# Patient Record
Sex: MALE | Race: BLACK OR AFRICAN AMERICAN
[De-identification: names, ages, dates, MRNs, and addresses within clinical notes are randomized per-mention and may not be internally consistent; named-entity substitution may affect disease eponyms.]

---

## 2018-08-02 ENCOUNTER — HOSPITAL ENCOUNTER (EMERGENCY)
Dept: HOSPITAL 97 - ER | Age: 28
Discharge: HOME | End: 2018-08-02
Payer: SELF-PAY

## 2018-08-02 DIAGNOSIS — J03.00: Primary | ICD-10-CM

## 2018-08-02 DIAGNOSIS — R11.2: ICD-10-CM

## 2018-08-02 PROCEDURE — 87081 CULTURE SCREEN ONLY: CPT

## 2018-08-02 PROCEDURE — 99284 EMERGENCY DEPT VISIT MOD MDM: CPT

## 2018-08-02 NOTE — EDPHYS
Physician Documentation                                                                           

 [facility]                                                                

Name: Christian Smith                                                                             

Age: 28 yrs                                                                                       

Sex: Male                                                                                         

: 1990                                                                                   

MRN: [id number]                                                                                   

Arrival Date: 2018                                                                          

Time: 08:19                                                                                       

Account#: [id number]                                                                            

Bed 15                                                                                            

Private MD: None, None                                                                            

ED Physician Surendra Hilton                                                                       

HPI:                                                                                              

                                                                                             

08:34 This 28 yrs old Black Male presents to ER via Ambulatory with complaints of Abdominal   cp  

      Pain, Nausea/Vomiting, Fever.                                                               

08:34 The patient presents with sore throat.                                                  cp  

08:34 The patient describes throat pain as constant.                                          cp  

08:34 Onset: The symptoms/episode began/occurred 2 day(s) ago.                                cp  

08:34 Severity of symptoms: in the emergency department the symptoms are unchanged, despite   cp  

      home interventions. Associated signs and symptoms: Pertinent positives: fever, nausea,      

      Sore throat vomiting, abdominal pain.                                                       

                                                                                                  

Historical:                                                                                       

- Allergies:                                                                                      

08:25 No Known Allergies;                                                                     ss  

- Home Meds:                                                                                      

08:25 None [Active];                                                                          ss  

- PMHx:                                                                                           

08:25 None;                                                                                   ss  

- PSHx:                                                                                           

08:25 None;                                                                                   ss  

                                                                                                  

- Immunization history:: Adult Immunizations up to date.                                          

- Social history:: Smoking status: Patient/guardian denies using tobacco.                         

- Ebola Screening: : Patient denies exposure to infectious person Patient denies travel           

  to an Ebola-affected area in the 21 days before illness onset.                                  

                                                                                                  

                                                                                                  

ROS:                                                                                              

08:37 Constitutional: Negative for body aches, chills, fever, poor PO intake.                 cp  

08:37 Eyes: Negative for injury, pain, redness, and discharge.                                cp  

08:37 ENT: Positive for sore throat, Negative for drainage from ear(s), ear pain, rhinorrhea,     

      difficulty swallowing, difficulty handling secretions.                                      

08:37 Neck: Negative for pain with movement, pain at rest, stiffness.                             

08:37 Cardiovascular: Negative for chest pain.                                                    

08:37 Respiratory: Negative for cough, wheezing.                                                  

08:37 Abdomen/GI: Positive for abdominal pain, nausea and vomiting, Negative for diarrhea,        

      constipation, black/tarry stool, rectal bleeding.                                           

08:37 Back: Negative for pain at rest, pain with movement, radiated pain.                         

08:37 : Negative for urinary symptoms.                                                          

08:37 Skin: Negative for cellulitis, rash.                                                        

08:37 Neuro: Negative for altered mental status, weakness.                                        

08:37 All other systems are negative.                                                             

                                                                                                  

Exam:                                                                                             

08:42 Constitutional: The patient appears in no acute distress, alert, awake, non-toxic, well cp  

      developed, well nourished.                                                                  

08:42 Head/Face:  Normocephalic, atraumatic.                                                  cp  

08:42 Eyes: Periorbital structures: appear normal, Pupils: equal, round, and reactive to          

      light and accomodation, Extraocular movements: intact throughout, Conjunctiva: normal,      

      no exudate, no injection, Sclera: no appreciated abnormality, Lids and lashes: appear       

      normal, bilaterally.                                                                        

08:42 ENT: External ear(s): are unremarkable, Ear canal(s): are normal, clear, TM's: bulging,     

      is not appreciated, bilaterally, dullness, bilaterally, erythema, is not appreciated,       

      bilaterally, Nose: is normal, Mouth: Lips: moist, Oral mucosa: moist, Posterior             

      pharynx: Airway: no evidence of obstruction, patent, Tonsils: bilaterally enlarged,         

      with erythema, with exudate, Uvula: midline, swelling, is not appreciated, erythema,        

      that is mild.                                                                               

08:42 Neck: ROM/movement: is normal, is supple, without pain, no range of motions                 

      limitations, no meningismus, no nuchal rigidity, Lymph nodes: lymphadenopathy is            

      appreciated, anterior cervical nodes.                                                       

08:42 Chest/axilla: Inspection: normal, Palpation: is normal, no crepitus, no tenderness.         

08:42 Cardiovascular: Rate: normal, Rhythm: regular.                                              

08:42 Respiratory: the patient does not display signs of respiratory distress,  Respirations:     

      normal, no use of accessory muscles, no retractions, no splinting, no tachypnea,            

      labored breathing, is not present, Breath sounds: are clear throughout, no decreased        

      breath sounds, no stridor, no wheezing.                                                     

08:42 Abdomen/GI: Inspection: abdomen appears normal, Bowel sounds: active, all quadrants,        

      Palpation: soft, in all quadrants, mild abdominal tenderness, in the epigastric area,       

      rebound tenderness, is not appreciated, voluntary guarding, is not appreciated,             

      involuntary guarding, is not appreciated.                                                   

08:42 Back: pain, is absent, ROM is normal.                                                       

08:42 Skin: cellulitis, is not appreciated, no rash present.                                      

08:42 Neuro: Orientation: to person, place \T\ time. Mentation: lucid, able to follow commands.   

                                                                                                  

Vital Signs:                                                                                      

08:25  / 72; Pulse 99; Resp 15; Temp 99.2(O); Pulse Ox 97% on R/A; Weight 72.57 kg;     ss  

      Height 5 ft. 5 in. (165.10 cm); Pain 5/10;                                                  

09:48 BP 96 / 77; Pulse 97; Resp 17; Pulse Ox 97% on R/A;                                     mh5 

09:55  / 65; Pulse 89; Resp 17; Pulse Ox 96% on R/A; Pain 2/10;                         rb1 

08:25 Body Mass Index 26.63 (72.57 kg, 165.10 cm)                                             ss  

                                                                                                  

MDM:                                                                                              

08:21 Patient medically screened.                                                             cp  

09:47 Data reviewed: vital signs, nurses notes, lab test result(s), and as a result, I will   cp  

      discharge patient.                                                                          

09:47 Differential diagnosis: apthous ulcer, group A strep tonsillitis, laryngitis, ekta's  cp  

      angina, peritonsillar abscess pharyngitis, retropharyngeal abcess. Counseling: I had a      

      detailed discussion with the patient and/or guardian regarding: the historical points,      

      exam findings, and any diagnostic results supporting the discharge/admit diagnosis, to      

      return to the emergency department if symptoms worsen or persist or if there are any        

      questions or concerns that arise at home. Response to treatment: the patient's symptoms     

      have markedly improved after treatment, VSS. Nausea and pain improved. No vomiting          

      observed in ED, and as a result, I will discharge patient.                                  

                                                                                                  

                                                                                             

08:34 Order name: Strep; Complete Time: 09:53                                                 cp  

                                                                                             

09:53 Interpretation: Reviewed.                                                               cp  

                                                                                                  

Administered Medications:                                                                         

08:42 Drug: Zofran 4 mg Route: PO;                                                            rb1 

09:10 Follow up: Response: No adverse reaction; Nausea is decreased                           rb1 

08:42 Drug: Ibuprofen 800 mg Route: PO;                                                       rb1 

09:10 Follow up: Response: No adverse reaction; Pain is decreased                             rb1 

09:49 Drug: Augmentin 875 mg Route: PO;                                                       rb1 

09:55 Follow up: Response: No adverse reaction; Medication administered at discharge.         rb1 

                                                                                                  

                                                                                                  

Disposition:                                                                                      

11:23 Co-signature as Attending Physician, Surendra Hilton MD I agree with the assessment and   kdr 

      plan of care.                                                                               

                                                                                                  

Disposition:                                                                                      

18 09:48 Discharged to Home. Impression: Streptococcal tonsillitis, Nausea and vomiting.    

- Condition is Stable.                                                                            

- Discharge Instructions: Nausea and Vomiting, Adult, Tonsillitis.                                

- Prescriptions for Augmentin 875- 125 mg Oral Tablet - take 1 tablet by ORAL route               

  every 12 hours for 10 days; 20 tablet. Zofran 4 mg Oral Tablet - take 1 tablet by               

  ORAL route every 12 hours As needed; 20 tablet. Ibuprofen 800 mg Oral Tablet - take 1           

  tablet by ORAL route every 8 hours As needed take with food; 30 tablet.                         

- Medication Reconciliation Form, Thank You Letter, Antibiotic Education, Prescription            

  Opioid Use form.                                                                                

- Follow up: Private Physician; When: 48 Hours; Reason: Recheck today's complaints.               

- Problem is new.                                                                                 

- Symptoms have improved.                                                                         

                                                                                                  

                                                                                                  

                                                                                                  

Signatures:                                                                                       

Dispatcher [employer]                           EDMS                                                 

uSrendra Hilton MD MD   kdr                                                  

Yue Wood RN                      RN   ss                                                   

J Carlos Dietz PA                         PA   Amira Collazo RN                     RN   rb1                                                  

                                                                                                  

Corrections: (The following items were deleted from the chart)                                    

09:56 09:48 2018 09:48 Discharged to Home. Impression: Streptococcal tonsillitis;       rb1 

      Nausea and vomiting. Condition is Stable. Forms are Medication Reconciliation Form,         

      Thank You Letter, Antibiotic Education, Prescription Opioid Use. Follow up: Private         

      Physician; When: 48 Hours; Reason: Recheck today's complaints. Problem is new. Symptoms     

      have improved. cp                                                                           

23: 08:40 Constitutional: The patient appears in no acute distress, alert, awake,     cp  

      non-toxic, well developed, well nourished, cp                                               

                                                                                             

23: 08:40 Head/Face: Normocephalic, atraumatic. cp                                    cp  

                                                                                             

23: 08:40 Eyes: Periorbital structures: appear normal, Conjunctiva: normal, no        cp  

      exudate, no injection, Sclera: no appreciated abnormality, Lids and lashes: appear          

      normal, bilaterally, cp                                                                     

/                                                                                             

23:26  08:40 ENT: External ear(s): are unremarkable, Ear canal(s): are normal, clear,    cp  

      TM's: bulging, is not appreciated, bilaterally, dullness, bilaterally, erythema, is not     

      appreciated, bilaterally, Nose: is normal, Mouth: Lips: moist, Oral mucosa: moist,          

      Posterior pharynx: Airway: no evidence of obstruction, patent, Tonsils: bilaterally         

      enlarged, with erythema, with exudate, Uvula: midline, swelling, is not appreciated,        

      erythema, that is mild, Voice: is normal, cp                                                

23: 08:40 Neck: ROM/movement: is normal, is supple, without pain, no range of motions cp  

      limitations, no meningismus, no nuchal rigidity, Lymph nodes: lymphadenopathy is            

      appreciated, anterior cervical nodes, cp                                                    

23: 08:40 Chest/axilla: Inspection: normal, Palpation: is normal, no crepitus, no     cp  

      tenderness, cp                                                                              

23: 08:40 Cardiovascular: Rate: normal, Rhythm: regular, cp                           cp  

23: 08:40 Respiratory: the patient does not display signs of respiratory distress,    cp  

      Respirations: normal, no use of accessory muscles, no retractions, no splinting, no         

      tachypnea, labored breathing, is not present, Breath sounds: are clear throughout, no       

      decreased breath sounds, no stridor, no wheezing, cp                                        

23: 08:40 Abdomen/GI: Inspection: abdomen appears normal, Bowel sounds: active, all   cp  

      quadrants, Palpation: soft, in all quadrants, mild abdominal tenderness, in the             

      epigastric area, rebound tenderness, is not appreciated, voluntary guarding, is not         

      appreciated, involuntary guarding, is not appreciated, cp                                   

23: 08:40 Back: pain, is absent, ROM is normal, cp                                    cp  

23: 08:40 Skin: cellulitis, is not appreciated, no rash present. cp                   cp  

23: 08:40 Neuro: Orientation: to person, place \T\ time. Mentation: is normal, cp       cp

                                                                                                  

**************************************************************************************************

## 2018-08-02 NOTE — ER
Nurse's Notes                                                                                     

 [facility]                                                                

Name: Christian Smith                                                                             

Age: 28 yrs                                                                                       

Sex: Male                                                                                         

: 1990                                                                                   

MRN: [id number]                                                                                   

Arrival Date: 2018                                                                          

Time: 08:19                                                                                       

Account#: [id number]                                                                            

Bed 15                                                                                            

Private MD: None, None                                                                            

Diagnosis: Streptococcal tonsillitis;Nausea and vomiting                                          

                                                                                                  

Presentation:                                                                                     

                                                                                             

08:24 Presenting complaint: Patient states: sore throat, N/V and fever x 2 days. Transition   ss  

      of care: patient was not received from another setting of care. Onset of symptoms was       

      2018. Risk Assessment: Do you want to hurt yourself or someone else? Patient       

      reports no desire to harm self or others. Initial Sepsis Screen: Does the patient meet      

      any 2 criteria? No. Patient's initial sepsis screen is negative. Does the patient have      

      a suspected source of infection? No. Patient's initial sepsis screen is negative. Care      

      prior to arrival: None.                                                                     

08:24 Method Of Arrival: Ambulatory                                                           ss  

08:24 Acuity: FLORENTINO 4                                                                           ss  

                                                                                                  

Historical:                                                                                       

- Allergies:                                                                                      

08:25 No Known Allergies;                                                                     ss  

- Home Meds:                                                                                      

08:25 None [Active];                                                                          ss  

- PMHx:                                                                                           

08:25 None;                                                                                   ss  

- PSHx:                                                                                           

08:25 None;                                                                                   ss  

                                                                                                  

- Immunization history:: Adult Immunizations up to date.                                          

- Social history:: Smoking status: Patient/guardian denies using tobacco.                         

- Ebola Screening: : Patient denies exposure to infectious person Patient denies travel           

  to an Ebola-affected area in the 21 days before illness onset.                                  

                                                                                                  

                                                                                                  

Screenin:27 Abuse screen: Denies threats or abuse. Nutritional screening: No deficits noted.        rb1 

      Tuberculosis screening: No symptoms or risk factors identified. Fall Risk None              

      identified.                                                                                 

                                                                                                  

Assessment:                                                                                       

08:27 General: Appears in no apparent distress. comfortable, Behavior is calm, cooperative,   rb1 

      Reports fever for 1-2 days. Pain: Complains of pain in abdomen Pain currently is 4 out      

      of 10 on a pain scale. Quality of pain is described as pain started after the pt.           

      vomited. Neuro: Level of Consciousness is awake, alert, obeys commands, Oriented to         

      person, place, time, situation. Cardiovascular: Capillary refill < 3 seconds is brisk       

      in bilateral fingers. Respiratory: Airway is patent Respiratory effort is even,             

      unlabored, Respiratory pattern is regular, symmetrical. GI: Bowel sounds present X 4        

      quads. Abd is soft X 4 quads Reports diarrhea, nausea, vomiting, since x 2 days. : No     

      signs and/or symptoms were reported regarding the genitourinary system. Derm: Skin is       

      dry, Skin is normal, Skin temperature is warm.                                              

09:25 Reassessment: Patient appears in no apparent distress at this time. No changes from     rb1 

      previously documented assessment.                                                           

                                                                                                  

Vital Signs:                                                                                      

08:25  / 72; Pulse 99; Resp 15; Temp 99.2(O); Pulse Ox 97% on R/A; Weight 72.57 kg;     ss  

      Height 5 ft. 5 in. (165.10 cm); Pain 5/10;                                                  

09:48 BP 96 / 77; Pulse 97; Resp 17; Pulse Ox 97% on R/A;                                     mh5 

09:55  / 65; Pulse 89; Resp 17; Pulse Ox 96% on R/A; Pain 2/10;                         rb1 

08:25 Body Mass Index 26.63 (72.57 kg, 165.10 cm)                                               

                                                                                                  

ED Course:                                                                                        

08:19 Patient arrived in ED.                                                                  sb2 

08:20 None, None is Private Physician.                                                        sb2 

08:21 J Carlos Dietz PA is PHCP.                                                                cp  

08:21 Surendra Hilton MD is Attending Physician.                                              cp  

08:25 Triage completed.                                                                       ss  

08:25 Arm band placed on right wrist.                                                         ss  

08:27 Amira Quiñonez, RN is Primary Nurse.                                                   rb1 

08:27 Patient has correct armband on for positive identification. Bed in low position. Call   rb1 

      light in reach. Side rails up X 1. Pulse ox on. NIBP on.                                    

08:42 Strep Sent.                                                                             rb1 

09:55 No provider procedures requiring assistance completed. Patient did not have IV access   rb1 

      during this emergency room visit.                                                           

                                                                                                  

Administered Medications:                                                                         

08:42 Drug: Zofran 4 mg Route: PO;                                                            rb1 

09:10 Follow up: Response: No adverse reaction; Nausea is decreased                           rb1 

08:42 Drug: Ibuprofen 800 mg Route: PO;                                                       rb1 

09:10 Follow up: Response: No adverse reaction; Pain is decreased                             rb1 

09:49 Drug: Augmentin 875 mg Route: PO;                                                       rb1 

09:55 Follow up: Response: No adverse reaction; Medication administered at discharge.         rb1 

                                                                                                  

                                                                                                  

Outcome:                                                                                          

09:48 Discharge ordered by MD.                                                                cp  

09:55 Discharged to home ambulatory, with family.                                             rb1 

09:55 Condition: stable                                                                           

09:55 Discharge instructions given to patient, Instructed on discharge instructions, follow       

      up and referral plans. medication usage, Demonstrated understanding of instructions,        

      follow-up care, medications, Prescriptions given X 3.                                       

09:56 Patient left the ED.                                                                    rb1 

                                                                                                  

Signatures:                                                                                       

Yue Wood, RN                      RN   ss                                                   

J Carlos Dietz PA PA cp Barber, Rebecca, RN                     RN   rb1                                                  

Henny Cantor                              [facility] [place]                                                  

Julia Britton                               [facility]                                                  

                                                                                                  

**************************************************************************************************

## 2019-06-24 ENCOUNTER — HOSPITAL ENCOUNTER (EMERGENCY)
Dept: HOSPITAL 97 - ER | Age: 29
Discharge: HOME | End: 2019-06-24
Payer: SELF-PAY

## 2019-06-24 DIAGNOSIS — J02.9: Primary | ICD-10-CM

## 2019-06-24 PROCEDURE — 87070 CULTURE OTHR SPECIMN AEROBIC: CPT

## 2019-06-24 PROCEDURE — 96374 THER/PROPH/DIAG INJ IV PUSH: CPT

## 2019-06-24 PROCEDURE — 99283 EMERGENCY DEPT VISIT LOW MDM: CPT

## 2019-06-24 PROCEDURE — 96372 THER/PROPH/DIAG INJ SC/IM: CPT

## 2019-06-24 PROCEDURE — 87081 CULTURE SCREEN ONLY: CPT

## 2019-06-24 NOTE — EDPHYS
Physician Documentation                                                                           

 [facility]                                                                 

Name: Christian Smith                                                                             

Age: 28 yrs                                                                                       

Sex: Male                                                                                         

: 1990                                                                                   

MRN: [id number]                                                                                   

Arrival Date: 2019                                                                          

Time: 21:23                                                                                       

Account#: [id number]                                                                            

Bed 20                                                                                            

Private MD:                                                                                       

ED Physician J Carlos Mittal                                                                      

HPI:                                                                                              

                                                                                             

21:52 This 28 yrs old Black Male presents to ER via Ambulatory with complaints of Fever,      snw 

      Headache, Sore Throat.                                                                      

21:52 The patient reports fever, not measured (subjective). Onset: The symptoms/episode       snw 

      began/occurred suddenly, 1 day(s) ago, and became persistent. Associated signs and          

      symptoms: Pertinent positives: headache, sore throat. Severity of symptoms: At their        

      worst the symptoms were moderate. The patient has not experienced similar symptoms in       

      the past. It is unknown whether or not the patient has recently seen a physician.           

                                                                                                  

Historical:                                                                                       

- Allergies:                                                                                      

21:27 No Known Allergies;                                                                     aa1 

- Home Meds:                                                                                      

21:27 None [Active];                                                                          aa1 

- PMHx:                                                                                           

21:27 None;                                                                                   aa1 

- PSHx:                                                                                           

21:27 None;                                                                                   aa1 

                                                                                                  

- Immunization history:: Flu vaccine is not up to date.                                           

- Social history:: Smoking status: Patient/guardian denies using tobacco.                         

- Ebola Screening: : Patient denies exposure to infectious person Patient denies travel           

  to an Ebola-affected area in the 21 days before illness onset.                                  

                                                                                                  

                                                                                                  

ROS:                                                                                              

21:51 Constitutional: Negative for fever, chills, and weight loss, Eyes: Negative for injury, snw 

      pain, redness, and discharge, ENT: Negative for injury and discharge, + sore throat         

      Neck: Negative for injury, pain, and swelling, Cardiovascular: Negative for chest pain,     

      palpitations, and edema, Respiratory: Negative for shortness of breath, cough,              

      wheezing, and pleuritic chest pain, Abdomen/GI: Negative for abdominal pain, nausea,        

      vomiting, diarrhea, and constipation, Back: Negative for injury and pain, : Negative      

      for injury, bleeding, discharge, and swelling, MS/Extremity: Negative for injury and        

      deformity, Skin: Negative for injury, rash, and discoloration, Neuro: Negative for          

      headache, weakness, numbness, tingling, and seizure, Psych: Negative for depression,        

      anxiety, suicide ideation, homicidal ideation, and hallucinations.                          

                                                                                                  

Exam:                                                                                             

21:50 Constitutional:  This is a well developed, well nourished patient who is awake, alert,  snw 

      and in no acute distress. Head/Face:  Normocephalic, atraumatic. Eyes:  Pupils equal        

      round and reactive to light, extra-ocular motions intact.  Lids and lashes normal.          

      Conjunctiva and sclera are non-icteric and not injected.  Cornea within normal limits.      

      Periorbital areas with no swelling, redness, or edema. Neck:  Trachea midline, no           

      thyromegaly or masses palpated, and no cervical lymphadenopathy.  Supple, full range of     

      motion without nuchal rigidity, or vertebral point tenderness.  No Meningismus.             

      Chest/axilla:  Normal chest wall appearance and motion.  Nontender with no deformity.       

      No lesions are appreciated. Cardiovascular:  Tachycardic rate and rhythm with a normal      

      S1 and S2.  No gallops, murmurs, or rubs.  Normal PMI, no JVD.  No pulse deficits.          

      Respiratory:  Lungs have equal breath sounds bilaterally, clear to auscultation and         

      percussion.  No rales, rhonchi or wheezes noted.  No increased work of breathing, no        

      retractions or nasal flaring. Abdomen/GI:  Soft, non-tender, with normal bowel sounds.      

      No distension or tympany.  No guarding or rebound.  No evidence of tenderness               

      throughout. Back:  No spinal tenderness.  No costovertebral tenderness.  Full range of      

      motion. Skin:  Warm, dry with normal turgor.  Normal color with no rashes, no lesions,      

      and no evidence of cellulitis. MS/ Extremity:  Pulses equal, no cyanosis.                   

      Neurovascular intact.  Full, normal range of motion. Neuro:  Awake and alert, GCS 15,       

      oriented to person, place, time, and situation.  Cranial nerves II-XII grossly intact.      

      Motor strength 5/5 in all extremities.  Sensory grossly intact.  Cerebellar exam            

      normal.  Normal gait. Psych:  Awake, alert, with orientation to person, place and time.     

       Behavior, mood, and affect are within normal limits.                                       

21:50 ENT: External ear(s): are unremarkable, Ear canal(s): are normal, Nose: is normal,          

      Mouth: is normal, Posterior pharynx: swelling, that is moderate, erythema, that is          

      moderate, exudate, that is moderate, Voice: is normal.                                      

                                                                                                  

Vital Signs:                                                                                      

21:27  / 75; Pulse 103; Resp 20; Temp 101.5; Pulse Ox 98% on R/A; Weight 72.57 kg;      aa1 

      Height 5 ft. 5 in. (165.10 cm); Pain 5/10;                                                  

22:29  / 81; Pulse 97; Resp 20; Temp 100.2(O); Pulse Ox 100% on R/A; Pain 5/10;         ed1 

21:27 Body Mass Index 26.63 (72.57 kg, 165.10 cm)                                             aa1 

                                                                                                  

MDM:                                                                                              

21:24 Patient medically screened.                                                             snw 

21:59 Data reviewed: vital signs, nurses notes. Data interpreted: Pulse oximetry: on room air snw 

      is 98 %. Interpretation: normal. Counseling: I had a detailed discussion with the           

      patient and/or guardian regarding: the historical points, exam findings, and any            

      diagnostic results supporting the discharge/admit diagnosis, lab results, the need for      

      outpatient follow up, for definitive care. Response to treatment: There is no               

      appreciated change of the patient's symptoms at this time. Special discussion: Based on     

      the history and exam findings, there is no indication for further emergent testing or       

      inpatient evaluation. I discussed with the patient/guardian the need to see the primary     

      care provider for further evaluation of the symptoms.                                       

                                                                                                  

                                                                                             

21:24 Order name: Strep; Complete Time: 21:55                                                 snw 

                                                                                             

21:56 Order name: Throat Culture                                                              EDMS

                                                                                                  

Administered Medications:                                                                         

22:13 Drug: Decadron - Dexamethasone 10 mg {Note: Given PO per order.} Route: IVP; Site:      ed1 

      Other;                                                                                      

22:33 Follow up: Response: No adverse reaction                                                ed1 

22:14 Drug: Lortab Liquid 10 ml Route: PO;                                                    ed1 

22:33 Follow up: Response: No adverse reaction                                                ed1 

22:14 Drug: Bicillin L-A 2.4 million units Route: IM; Site: left ventrogluteal;               ed1 

22:33 Follow up: Response: No adverse reaction                                                ed1 

                                                                                                  

                                                                                                  

Disposition:                                                                                      

                                                                                             

06:54 Co-signature as Attending Physician, J Carlos Mittal MD I agree with the assessment and  wes 

      plan of care.                                                                               

                                                                                                  

Disposition:                                                                                      

19 21:57 Discharged to Home. Impression: Acute pharyngitis.                                 

- Condition is Stable.                                                                            

- Discharge Instructions: Fever, Adult, Pharyngitis, Rehydration, Adult.                          

- Prescriptions for Prednisone 20 mg Oral Tablet - take 1 tablet by ORAL route once               

  daily for 5 days; 5 tablet.                                                                     

- Work release form, Medication Reconciliation Form, Thank You Letter, Antibiotic                 

  Education, Prescription Opioid Use form.                                                        

- Follow up: Emergency Department; When: As needed; Reason: Worsening of condition.               

  Follow up: Private Physician; When: 2 - 3 days; Reason: Recheck today's complaints,             

  Continuance of care, Re-evaluation by your physician.                                           

                                                                                                  

                                                                                                  

                                                                                                  

Signatures:                                                                                       

Dispatcher [employer]                           EDMS                                                 

Karlee Bloom RN                  RN   aa1                                                  

J Carlos Mittal MD MD cha Therrien, Shelly, FNP-C                 FNP-Csnw                                                  

Amy Ventura RN                        RN   ed1                                                  

                                                                                                  

Corrections: (The following items were deleted from the chart)                                    

                                                                                             

22:33 21:57 2019 21:57 Discharged to Home. Impression: Acute pharyngitis. Condition is  ed1 

      Stable. Forms are Medication Reconciliation Form, Thank You Letter, Antibiotic              

      Education, Prescription Opioid Use. Follow up: Emergency Department; When: As needed;       

      Reason: Worsening of condition. Follow up: Private Physician; When: 2 - 3 days; Reason:     

      Recheck today's complaints, Continuance of care, Re-evaluation by your physician. snw       

                                                                                                  

**************************************************************************************************

## 2019-06-24 NOTE — ER
Nurse's Notes                                                                                     

 [facility]                                                                 

Name: Christian Smith                                                                             

Age: 28 yrs                                                                                       

Sex: Male                                                                                         

: 1990                                                                                   

MRN: [id number]                                                                                   

Arrival Date: 2019                                                                          

Time: 21:23                                                                                       

Account#: [id number]                                                                            

Bed 20                                                                                            

Private MD:                                                                                       

Diagnosis: Acute pharyngitis                                                                      

                                                                                                  

Presentation:                                                                                     

                                                                                             

21:26 Presenting complaint: Patient states: fever, sore throat, \T\ headache since yesterday.   aa1

      Transition of care: patient was not received from another setting of care. Onset of         

      symptoms was 2019. Risk Assessment: Do you want to hurt yourself or someone        

      else? Patient reports no desire to harm self or others. Initial Sepsis Screen: Does the     

      patient meet any 2 criteria? Temp <36.0*C (96.8*F)) or > 38.3*C (100.9*F). Does the         

      patient have a suspected source of infection? No. Patient's initial sepsis screen is        

      negative. Care prior to arrival: None.                                                      

21:26 Method Of Arrival: Ambulatory                                                           aa1 

21:26 Acuity: FLORENTINO 4                                                                           aa1 

                                                                                                  

Triage Assessment:                                                                                

21:27 General: Appears in no apparent distress. uncomfortable, Behavior is calm, cooperative, aa1 

      appropriate for age.                                                                        

                                                                                                  

Historical:                                                                                       

- Allergies:                                                                                      

21:27 No Known Allergies;                                                                     aa1 

- Home Meds:                                                                                      

21:27 None [Active];                                                                          aa1 

- PMHx:                                                                                           

21:27 None;                                                                                   aa1 

- PSHx:                                                                                           

21:27 None;                                                                                   aa1 

                                                                                                  

- Immunization history:: Flu vaccine is not up to date.                                           

- Social history:: Smoking status: Patient/guardian denies using tobacco.                         

- Ebola Screening: : Patient denies exposure to infectious person Patient denies travel           

  to an Ebola-affected area in the 21 days before illness onset.                                  

                                                                                                  

                                                                                                  

Screenin:30 Abuse screen: Denies threats or abuse. Denies injuries from another. Nutritional        ed1 

      screening: No deficits noted. Tuberculosis screening: No symptoms or risk factors           

      identified. Fall Risk None identified.                                                      

                                                                                                  

Assessment:                                                                                       

21:30 General: Appears uncomfortable, Behavior is calm, cooperative. Pain: Complains of pain  ed1 

      in throat Pain currently is 5 out of 10 on a pain scale. Quality of pain is described       

      as burning, Pain began 2-3 days ago. Neuro: Level of Consciousness is awake, alert,         

      obeys commands, Oriented to person, place, time, situation, Reports headache in entire      

      frontal area. Cardiovascular: Denies chest pain, Heart tones S1 S2 present.                 

      Respiratory: Airway is patent Respiratory effort is even, unlabored, Respiratory            

      pattern is regular, symmetrical, Breath sounds are clear bilaterally. GI: No signs          

      and/or symptoms were reported involving the gastrointestinal system. : No signs           

      and/or symptoms were reported regarding the genitourinary system. EENT: Throat is           

      reddened has enlarged tonsils bilaterally. Derm: Skin is intact, is healthy with good       

      turgor, Skin is dry, Skin is normal, Skin temperature is warm. Musculoskeletal:             

      Circulation, motion, and sensation intact. Range of motion: intact in all extremities.      

22:29 Reassessment: Patient appears in no apparent distress at this time. No changes from     ed1 

      previously documented assessment. Patient and/or family updated on plan of care and         

      expected duration. Pain level reassessed. Patient is alert, oriented x 3, equal             

      unlabored respirations, skin warm/dry/pink. Patient states symptoms have not improved.      

                                                                                                  

Vital Signs:                                                                                      

21:27  / 75; Pulse 103; Resp 20; Temp 101.5; Pulse Ox 98% on R/A; Weight 72.57 kg;      aa1 

      Height 5 ft. 5 in. (165.10 cm); Pain 5/10;                                                  

22:29  / 81; Pulse 97; Resp 20; Temp 100.2(O); Pulse Ox 100% on R/A; Pain 5/10;         ed1 

21:27 Body Mass Index 26.63 (72.57 kg, 165.10 cm)                                             aa1 

                                                                                                  

ED Course:                                                                                        

21:23 Patient arrived in ED.                                                                  es  

21:24 Daly Macias FNP-C is PHCP.                                                        snw 

21:24 J Carlos Mittal MD is Attending Physician.                                             snw 

21:26 Triage completed.                                                                       aa1 

21:27 Arm band placed on left wrist. Patient placed in an exam room, on a stretcher.          aa1 

21:29 Amy Ventura, RYAN is Primary Nurse.                                                      ed1 

21:30 Patient has correct armband on for positive identification. Bed in low position. Call   ed1 

      light in reach. Adult w/ patient.                                                           

21:42 Strep swab sent to lab.                                                                 ag4 

22:29 No provider procedures requiring assistance completed. Patient did not have IV access   ed1 

      during this emergency room visit.                                                           

                                                                                                  

Administered Medications:                                                                         

22:13 Drug: Decadron - Dexamethasone 10 mg {Note: Given PO per order.} Route: IVP; Site:      ed1 

      Other;                                                                                      

22:33 Follow up: Response: No adverse reaction                                                ed1 

22:14 Drug: Lortab Liquid 10 ml Route: PO;                                                    ed1 

:33 Follow up: Response: No adverse reaction                                                ed1 

22:14 Drug: Bicillin L-A 2.4 million units Route: IM; Site: left ventrogluteal;               ed1 

22:33 Follow up: Response: No adverse reaction                                                ed1 

                                                                                                  

                                                                                                  

Outcome:                                                                                          

21:57 Discharge ordered by MD. fraire 

22:29 Discharged to home ambulatory, with family.                                             ed1 

22:29 Condition: good                                                                             

22:29 Discharge instructions given to patient, Instructed on discharge instructions, follow       

      up and referral plans. medication usage, Demonstrated understanding of instructions,        

      follow-up care, medications, Prescriptions given X 1.                                       

22:33 Patient left the ED.                                                                    ed1 

                                                                                                  

Signatures:                                                                                       

Karlee Bloom RN                  RN   aa1                                                  

Daly Macias, FNP-C                 FNP-Csnw                                                  

Yue Kaur Erika, RN                        RN   ed1                                                  

Moncho Wyatt                                 ag4                                                  

                                                                                                  

**************************************************************************************************

## 2021-06-17 ENCOUNTER — HOSPITAL ENCOUNTER (EMERGENCY)
Dept: HOSPITAL 97 - ER | Age: 31
Discharge: HOME | End: 2021-06-17
Payer: SELF-PAY

## 2021-06-17 VITALS — OXYGEN SATURATION: 100 % | SYSTOLIC BLOOD PRESSURE: 115 MMHG | DIASTOLIC BLOOD PRESSURE: 72 MMHG

## 2021-06-17 VITALS — TEMPERATURE: 99.2 F

## 2021-06-17 DIAGNOSIS — H61.21: Primary | ICD-10-CM

## 2021-06-17 NOTE — EDPHYS
Physician Documentation                                                                           

 [facility]                                                                 

Name: Christian Smith                                                                             

Age: 30 yrs                                                                                       

Sex: Male                                                                                         

: 1990                                                                                   

MRN: [id number]                                                                                   

Arrival Date: 2021                                                                          

Time: 13:56                                                                                       

Account#: [id number]                                                                            

Bed 25                                                                                            

Private MD:                                                                                       

ED Physician Surendra Hilton                                                                       

HPI:                                                                                              

                                                                                             

15:34 This 30 yrs old Black Male presents to ER via Ambulatory with complaints of Ear Problem.cp  

15:35 The patient presents with hearing loss, partial. The complaints affect the right ear.   cp  

      Onset: The symptoms/episode began/occurred this week. Associated signs and symptoms:        

      Pertinent negatives: fever, rhinorrhea, sinus trouble, sore throat, ear pain.               

                                                                                                  

Historical:                                                                                       

- Allergies:                                                                                      

14:12 No Known Allergies;                                                                     tw2 

- Home Meds:                                                                                      

14:12 None [Active];                                                                          tw2 

- PMHx:                                                                                           

14:12 None;                                                                                   tw2 

- PSHx:                                                                                           

14:12 None;                                                                                   tw2 

                                                                                                  

- Immunization history:: Client reports having NOT received the Covid vaccine.                    

- Social history:: Smoking status: Patient denies any tobacco usage or history of.                

                                                                                                  

                                                                                                  

ROS:                                                                                              

15:35 Constitutional: Negative for fever.                                                     cp  

15:35 ENT: Positive for hearing loss, Negative for drainage from ear(s), ear pain, sore           

      throat, difficulty swallowing, difficulty handling secretions.                              

15:35 All other systems are negative.                                                             

                                                                                                  

Exam:                                                                                             

15:36 Head/Face:  Normocephalic, atraumatic.                                                  cp  

15:36 Constitutional: The patient appears in no acute distress, alert, awake, non-toxic, well     

      developed, well nourished.                                                                  

15:36 Eyes: Periorbital structures: appear normal, Conjunctiva: normal, no exudate, no            

      injection, Lids and lashes: appear normal, bilaterally.                                     

15:36 ENT: External ear(s): are unremarkable, Ear canal(s): cerumen impaction, that is            

      moderate, occluding the right ear canal, Examination of the other ear shows no obvious      

      abnormality, Nose: is normal, Mouth: Lips: moist, Oral mucosa: moist, Posterior             

      pharynx: Airway: no evidence of obstruction, patent.                                        

15:36 Chest/axilla: Inspection: normal.                                                           

15:36 Cardiovascular: Rate: normal.                                                               

                                                                                                  

Vital Signs:                                                                                      

14:10  / 72; Pulse 72; Resp 16; Temp 99.2; Pulse Ox 99% on R/A; Weight 72.57 kg; Height tw2 

      5 ft. 5 in. (165.10 cm);                                                                    

15:30  / 72; Pulse 68; Resp 17; Pulse Ox 100% on R/A; Pain 0/10;                        ap3 

14:10 Body Mass Index 26.63 (72.57 kg, 165.10 cm)                                             tw2 

                                                                                                  

Procedures:                                                                                       

16:15 Ear irrigation: Route right ear with Other warm water amount Other 60 ccs Patient       cp  

      tolerated well impacted cerumen removed.                                                    

                                                                                                  

MDM:                                                                                              

15:24 Patient medically screened.                                                             cp  

16:00 Differential diagnosis: otitis media, otitis externa, ruptured TM, foreign body,        cp  

      cerumen impaction.                                                                          

16:10 Data reviewed: vital signs, nurses notes, and as a result, I will discharge patient.    cp  

                                                                                                  

Administered Medications:                                                                         

No medications were administered                                                                  

                                                                                                  

                                                                                                  

Disposition:                                                                                      

16:25 Chart complete.                                                                         cp  

                                                                                             

07:16 Co-signature as Attending Physician, Surendra Hilton MD I agree with the assessment and   kdr 

      plan of care.                                                                               

                                                                                                  

Disposition:                                                                                      

21 16:11 Discharged to Home. Impression: Impacted cerumen, right ear.                       

- Condition is Stable.                                                                            

- Discharge Instructions: Earwax Buildup, Adult.                                                  

                                                                                                  

- Medication Reconciliation Form, Thank You Letter, Antibiotic Education, Prescription            

  Opioid Use, Work release form form.                                                             

- Follow up: Rubia Acevedo MD; When: 2 - 3 days; Reason: Worsening of condition.            

- Problem is new.                                                                                 

- Symptoms have improved.                                                                         

                                                                                                  

                                                                                                  

                                                                                                  

Signatures:                                                                                       

Surendra Hilton MD MD   [facility]                                                  

J Carlos Dietz PA                         PA   cp                                                   

Catalina Gipson RN                          RN   tw2                                                  

uJli Johnson RN                    RN   ap3                                                  

                                                                                                  

Corrections: (The following items were deleted from the chart)                                    

                                                                                             

16:18 16:11 2021 16:11 Discharged to Home. Impression: Impacted cerumen, right ear.     ap3 

      Condition is Stable. Forms are Work release form, Medication Reconciliation Form, Thank     

      You Letter, Antibiotic Education, Prescription Opioid Use. Follow up: Rubia Acevedo; When: 2 - 3 days; Reason: Worsening of condition. Problem is new. Symptoms         

      have improved. cp                                                                           

                                                                                                  

**************************************************************************************************

## 2021-06-17 NOTE — ER
Nurse's Notes                                                                                     

 [facility]                                                                 

Name: Christian Smith                                                                             

Age: 30 yrs                                                                                       

Sex: Male                                                                                         

: 1990                                                                                   

MRN: [id number]                                                                                   

Arrival Date: 2021                                                                          

Time: 13:56                                                                                       

Account#: [id number]                                                                            

Bed 25                                                                                            

Private MD:                                                                                       

Diagnosis: Impacted cerumen, right ear                                                            

                                                                                                  

Presentation:                                                                                     

                                                                                             

14:10 Chief complaint: Patient states: went swimming on Monday and woke up the next morning   tw2 

      and couldn't hear anything out of right ear , denies pain, still can't hear anything        

      out of ear. Coronavirus screen: Client denies travel out of the [place] in the last 14         

      days. Client indicates they have traveled out of the [place] in the last 14 days. At this      

      time, unable to obtain information related to travel outside the [place] Ebola Screen:         

      Patient negative for fever greater than or equal to 101.5 degrees Fahrenheit, and           

      additional compatible Ebola Virus Disease symptoms Patient denies exposure to               

      infectious person. Patient denies travel to an Ebola-affected area in the 21 days           

      before illness onset. No symptoms or risks identified at this time. Initial Sepsis          

      Screen: Does the patient meet any 2 criteria? No. Patient's initial sepsis screen is        

      negative. Does the patient have a suspected source of infection? No. Patient's initial      

      sepsis screen is negative. Risk Assessment: Do you want to hurt yourself or someone         

      else? Patient reports no desire to harm self or others. Onset of symptoms was 2021.                                                                                       

14:10 Method Of Arrival: Ambulatory                                                           tw2 

14:10 Acuity: FLORENTINO 4                                                                           tw2 

                                                                                                  

Triage Assessment:                                                                                

14:10 General: Appears in no apparent distress. Behavior is calm, cooperative, appropriate    tw2 

      for age.                                                                                    

15:26 Pain: Complains of pain in right ear.                                                   tw2 

                                                                                                  

Historical:                                                                                       

- Allergies:                                                                                      

14:12 No Known Allergies;                                                                     tw2 

- Home Meds:                                                                                      

14:12 None [Active];                                                                          tw2 

- PMHx:                                                                                           

14:12 None;                                                                                   tw2 

- PSHx:                                                                                           

14:12 None;                                                                                   tw2 

                                                                                                  

- Immunization history:: Client reports having NOT received the Covid vaccine.                    

- Social history:: Smoking status: Patient denies any tobacco usage or history of.                

                                                                                                  

                                                                                                  

Screening:                                                                                        

15:26 Abuse screen: Denies threats or abuse. Nutritional screening: No deficits noted.        tw2 

      Tuberculosis screening: No symptoms or risk factors identified. Fall Risk None              

      identified.                                                                                 

                                                                                                  

Assessment:                                                                                       

15:29 General: Appears in no apparent distress. comfortable. Pain: Denies pain. Neuro: Level  ap3 

      of Consciousness is awake, alert, obeys commands, Oriented to person, place, time,          

      situation, Appropriate for age. Cardiovascular: Denies chest pain, shortness of breath,     

      Capillary refill < 3 seconds. Respiratory: Airway is patent Respiratory effort is even,     

      unlabored, Respiratory pattern is regular, symmetrical. GI: No signs and/or symptoms        

      were reported involving the gastrointestinal system. : No signs and/or symptoms were      

      reported regarding the genitourinary system. EENT: Reports decreased hearing in right       

      ear.                                                                                        

                                                                                                  

Vital Signs:                                                                                      

14:10  / 72; Pulse 72; Resp 16; Temp 99.2; Pulse Ox 99% on R/A; Weight 72.57 kg; Height tw2 

      5 ft. 5 in. (165.10 cm);                                                                    

15:30  / 72; Pulse 68; Resp 17; Pulse Ox 100% on R/A; Pain 0/10;                        ap3 

14:10 Body Mass Index 26.63 (72.57 kg, 165.10 cm)                                             tw2 

                                                                                                  

ED Course:                                                                                        

13:56 Patient arrived in ED.                                                                  as  

14:10 Arm band placed on.                                                                     tw2 

14:12 Triage completed.                                                                       tw2 

15:17 J Carlos Dietz PA is PHCP.                                                                cp  

15:17 Surendra Hilton MD is Attending Physician.                                              cp  

15:26 Bed in low position. Call light in reach. Pulse ox on. NIBP on.                         tw2 

15:28 Juli Johnson RN is Primary Nurse.                                                  ap3 

16:11 Rubia Acevedo MD is Referral Physician.                                           cp  

16:12 flushing of right ear.                                                                  ap3 

16:12 Patient did not have IV access during this emergency room visit.                        ap3 

                                                                                                  

Administered Medications:                                                                         

No medications were administered                                                                  

                                                                                                  

                                                                                                  

Outcome:                                                                                          

16:11 Discharge ordered by MD.                                                                cp  

16:18 Discharged to home ambulatory.                                                          ap3 

16:18 Condition: good                                                                             

16:18 Discharge instructions given to patient, Instructed on discharge instructions, follow       

      up and referral plans. Demonstrated understanding of instructions, follow-up care.          

16:18 Patient left the ED.                                                                    ap3 

                                                                                                  

Signatures:                                                                                       

Bire Cantor                             as                                                   

J Carlos Dietz PA                         PA   cp                                                   

Catalina Gipson RN                          RN   tw2                                                  

Prokisch, Juli, RN                    RN   ap3                                                  

                                                                                                  

Corrections: (The following items were deleted from the chart)                                    

15:26 14:10 Pain: Complains of pain in right ear and left ear tw2                             tw2 

                                                                                                  

**************************************************************************************************

## 2021-06-17 NOTE — XMS REPORT
Continuity of Care Document

                            Created on:2021



Patient:SABRINA OTOOLE

Sex:Male

:1990

External Reference #:[id number]





Demographics







                          Address                   [address] [place] [id number]

 

                          Home Phone                [phone]

 

                          Work Phone                [phone]

 

                          Mobile Phone              [phone]

 

                          Email Address             [email or web address]

 

                          Preferred Language        English

 

                          Marital Status            Unknown

 

                          Roman [employer] Affiliation     Unknown

 

                          Race                      Unknown

 

                          Additional Race(s)        Unavailable



                                                    Black or 

 

                          Ethnic Group              Not  or 









Author







                          Organization              [facility]

t

 

                          Address                   [address] [place] [address] [address]



                                                    [place] [address]

 

                          Phone                     [phone]









Support







                Name            Relationship    Address         Phone

 

                Loretta Hale   Mother          Unavailable     [phone]









Care Team Providers







                    Name                Role                Phone

 

                    Edward MIKE, Blas WINTER   Attending Clinician [phone]









Problems

This patient has no known problems.



Allergies, Adverse Reactions, Alerts

This patient has no known allergies or adverse reactions.



Medications

This patient has no known medications.



Procedures

This patient has no known procedures.



Encounters







        Start   End     Encounter Admission Attending Care    Care    Encounter 

Source



        Date/Time Date/Time Type    Type    Clinicians Facility Department ID   

   

 

        2020 Emergency         MAGALY Leon    [phone] [id number]

1972 



        04:38:41 06:12:00                 Blas Crandall [id number]         



                                                [place] [id number]         



                                                [place]  [phone]         



                                                        084             







Results

This patient has no known results.